# Patient Record
Sex: FEMALE | Race: WHITE | URBAN - METROPOLITAN AREA
[De-identification: names, ages, dates, MRNs, and addresses within clinical notes are randomized per-mention and may not be internally consistent; named-entity substitution may affect disease eponyms.]

---

## 2017-10-26 ENCOUNTER — OFFICE VISIT (OUTPATIENT)
Dept: ORTHOPEDIC SURGERY | Age: 27
End: 2017-10-26

## 2017-10-26 VITALS
WEIGHT: 195.99 LBS | DIASTOLIC BLOOD PRESSURE: 80 MMHG | RESPIRATION RATE: 17 BRPM | HEIGHT: 63 IN | BODY MASS INDEX: 34.73 KG/M2 | HEART RATE: 81 BPM | SYSTOLIC BLOOD PRESSURE: 119 MMHG

## 2017-10-26 DIAGNOSIS — M79.671 RIGHT FOOT PAIN: ICD-10-CM

## 2017-10-26 DIAGNOSIS — S92.354A CLOSED NONDISPLACED FRACTURE OF FIFTH METATARSAL BONE OF RIGHT FOOT, INITIAL ENCOUNTER: Primary | ICD-10-CM

## 2017-10-26 PROCEDURE — 73620 X-RAY EXAM OF FOOT: CPT | Performed by: ORTHOPAEDIC SURGERY

## 2017-10-26 PROCEDURE — L4361 PNEUMA/VAC WALK BOOT PRE OTS: HCPCS | Performed by: ORTHOPAEDIC SURGERY

## 2017-10-26 PROCEDURE — 28470 CLTX METATARSAL FX WO MNP EA: CPT | Performed by: ORTHOPAEDIC SURGERY

## 2017-10-26 PROCEDURE — G8427 DOCREV CUR MEDS BY ELIG CLIN: HCPCS | Performed by: ORTHOPAEDIC SURGERY

## 2017-10-26 PROCEDURE — 99203 OFFICE O/P NEW LOW 30 MIN: CPT | Performed by: ORTHOPAEDIC SURGERY

## 2017-10-26 PROCEDURE — G8417 CALC BMI ABV UP PARAM F/U: HCPCS | Performed by: ORTHOPAEDIC SURGERY

## 2017-10-26 PROCEDURE — G8484 FLU IMMUNIZE NO ADMIN: HCPCS | Performed by: ORTHOPAEDIC SURGERY

## 2017-10-26 PROCEDURE — 4004F PT TOBACCO SCREEN RCVD TLK: CPT | Performed by: ORTHOPAEDIC SURGERY

## 2017-10-26 RX ORDER — TRAMADOL HYDROCHLORIDE 50 MG/1
50 TABLET ORAL EVERY 6 HOURS PRN
Qty: 28 TABLET | Refills: 0 | Status: SHIPPED | OUTPATIENT
Start: 2017-10-26

## 2017-10-26 NOTE — PROGRESS NOTES
of wine twice a year    Drug use: No    Sexual activity: Yes     Partners: Male     Birth control/ protection: Implant     Other Topics Concern    Not on file     Social History Narrative    No narrative on file       Family History   Problem Relation Age of Onset    Adopted: Yes       Current Outpatient Prescriptions on File Prior to Visit   Medication Sig Dispense Refill    levothyroxine (SYNTHROID) 88 MCG tablet Take 1 tablet by mouth daily. For thyroid. 90 tablet 3    ibuprofen (ADVIL;MOTRIN) 800 MG tablet Take 800 mg by mouth daily. PRN  pain       No current facility-administered medications on file prior to visit. Pertinent items are noted in HPI  Review of systems reviewed from Patient History Form dated on 10/26/2017 and available in the patient's chart under the Media tab. PHYSICAL EXAMINATION:  Ms. Roberta Ledbetter is a very pleasant 28 y.o.  female who presents today in no acute distress, awake, alert, and oriented. She is well dressed, nourished and  groomed. Patient with normal affect. Height is  5' 2.99\" (1.6 m), weight is 195 lb 15.8 oz (88.9 kg), Body mass index is 34.73 kg/m². Resting respiratory rate is 16. Examination of the gait, showed that the patient walks with a crutch, NWB right  leg . Examination of both ankles showing a decreased range of motion of the right ankle compare to the other side because of foot pain. There is moderate swelling that can be seen, as well as ecchymosis over lateral side of the right foot. She  has intact sensation and good pedal pulses. She has significant tenderness on deep palpation over the 5th MT base right foot        IMAGING: Xray's were reviewed, dated 10/26/2017 ,  2 views of the right  foot, and showed a minimally displaced 5th MT base Andrade fracture. IMPRESSION: Right 5th MT base minimally displaced Andrade fracture.     PLAN: I discussed that the overall alignment of this fracture is good and that we can try to treat

## 2017-12-07 ENCOUNTER — OFFICE VISIT (OUTPATIENT)
Dept: ORTHOPEDIC SURGERY | Age: 27
End: 2017-12-07

## 2017-12-07 VITALS — HEIGHT: 62 IN | WEIGHT: 195 LBS | BODY MASS INDEX: 35.88 KG/M2 | RESPIRATION RATE: 15 BRPM

## 2017-12-07 DIAGNOSIS — M79.671 RIGHT FOOT PAIN: Primary | ICD-10-CM

## 2017-12-07 DIAGNOSIS — S99.191A CLOSED FRACTURE OF BASE OF FIFTH METATARSAL BONE OF RIGHT FOOT AT METAPHYSEAL-DIAPHYSEAL JUNCTION, INITIAL ENCOUNTER: ICD-10-CM

## 2017-12-07 PROCEDURE — 73630 X-RAY EXAM OF FOOT: CPT | Performed by: ORTHOPAEDIC SURGERY

## 2017-12-07 PROCEDURE — 99024 POSTOP FOLLOW-UP VISIT: CPT | Performed by: NURSE PRACTITIONER

## 2017-12-07 NOTE — LETTER
[facility] [address] [address]  Phone: [phone]  Fax: [phone]    Katerin Lo MD        December 21, 2017       Patient: Chris Rivera   MR Number: [id number]   YOB: 1990   Date of Visit: 12/7/2017       Dear Dr. Abhijit Hernández:    Thank you for the request for consultation for Chris Rivera to me for evaluation. Below are the relevant portions of my assessment and plan of care. DIAGNOSIS: Right foot 5th MT Andrade minimally displaced fracture. DATE OF INJURY:  10/26/2017 DOT:10/26/2017. Ms. Kyung Bullard 28 y.o.  female returns today for 6 weeks follow-up of a right foot 5th MT fracture. She reports minimal pain. Rates pain a 1-2/10 VAS mild achy and improving. Pain is worse with increased walking and better with rest and elevation. No numbness or tingling sensation. She is WB and is no longer in the boot at this point. She reports that she was incarcerated for 9 days and was not able to wear the boot while in intermediate. She has been WB for the past few weeks. PHYSICAL EXAMINATION:  Ms. Kyung Bullard is a very pleasant 28 y.o.  female who presents today in no acute distress, awake, alert, and oriented. She is well dressed, nourished and  groomed. Patient with normal affect. Height is  5' 2\" (1.575 m), weight is 195 lb (88.5 kg). Resting respiratory rate is 16. Examination of the gait, showed that the patient walks with no limp, WB right leg . Examination of both ankles showing a good range of motion of the right ankle compare to the other side. There is minimal to no swelling that can be seen, no ecchymosis over lateral side of the right foot. She has intact sensation and good pedal pulses.   She has no tenderness on deep palpation over the 5th MT base right foot        IMAGING: Xray's taken today in the office were reviewed, 3 views of the right foot, and showed a minimally displaced 5th MT base fracture healing

## 2018-03-15 ENCOUNTER — TELEPHONE (OUTPATIENT)
Dept: ORTHOPEDIC SURGERY | Age: 28
End: 2018-03-15

## 2024-10-11 ENCOUNTER — OFFICE VISIT (OUTPATIENT)
Age: 34
End: 2024-10-11

## 2024-10-11 VITALS
WEIGHT: 213 LBS | SYSTOLIC BLOOD PRESSURE: 143 MMHG | HEIGHT: 62 IN | OXYGEN SATURATION: 95 % | RESPIRATION RATE: 16 BRPM | DIASTOLIC BLOOD PRESSURE: 85 MMHG | TEMPERATURE: 98.4 F | HEART RATE: 87 BPM | BODY MASS INDEX: 39.2 KG/M2

## 2024-10-11 DIAGNOSIS — J40 BRONCHITIS: Primary | ICD-10-CM

## 2024-10-11 DIAGNOSIS — Z91.89 AT INCREASED RISK OF EXPOSURE TO COVID-19 VIRUS: ICD-10-CM

## 2024-10-11 LAB
Lab: NORMAL
PERFORMING INSTRUMENT: NORMAL
QC PASS/FAIL: NORMAL
SARS-COV-2, POC: NORMAL

## 2024-10-11 RX ORDER — METHYLPREDNISOLONE 4 MG
TABLET, DOSE PACK ORAL
Qty: 1 KIT | Refills: 0 | Status: SHIPPED | OUTPATIENT
Start: 2024-10-11

## 2024-10-11 RX ORDER — AZITHROMYCIN 250 MG/1
TABLET, FILM COATED ORAL
Qty: 6 TABLET | Refills: 0 | Status: SHIPPED | OUTPATIENT
Start: 2024-10-11 | End: 2024-10-21

## 2024-10-11 RX ORDER — AMITRIPTYLINE HYDROCHLORIDE 10 MG/1
10 TABLET ORAL NIGHTLY
COMMUNITY
Start: 2024-10-09 | End: 2025-10-09

## 2024-10-11 RX ORDER — BROMPHENIRAMINE MALEATE, PSEUDOEPHEDRINE HYDROCHLORIDE, AND DEXTROMETHORPHAN HYDROBROMIDE 2; 30; 10 MG/5ML; MG/5ML; MG/5ML
10 SYRUP ORAL 3 TIMES DAILY PRN
Qty: 240 ML | Refills: 0 | Status: SHIPPED | OUTPATIENT
Start: 2024-10-11

## 2024-10-11 RX ORDER — SERTRALINE HYDROCHLORIDE 25 MG/1
25 TABLET, FILM COATED ORAL DAILY
COMMUNITY
Start: 2024-10-02

## 2024-10-11 ASSESSMENT — ENCOUNTER SYMPTOMS
COUGH: 1
NAUSEA: 0
SINUS PRESSURE: 0
DIARRHEA: 0
SORE THROAT: 0
RHINORRHEA: 0
WHEEZING: 1
VOMITING: 0
TROUBLE SWALLOWING: 0
SHORTNESS OF BREATH: 0
CHEST TIGHTNESS: 0
SINUS PAIN: 0

## 2024-10-11 NOTE — PROGRESS NOTES
tylenol  and ibuprofen for pain    Go to the ER if you develop chest pain, chest pressure, symptoms worsen, or you develop:  Sweating.  Shortness of breath.  Nausea or vomiting.  Pain that spreads from the chest to the neck, jaw, or one or both shoulders or arms.  Dizziness or lightheadedness    Follow up with your primary care provider        Electronically signed by JENIFER Gibson CNP on 10/11/2024 at 3:24 PM

## 2024-10-11 NOTE — PATIENT INSTRUCTIONS
Take medications as directed    May alternate tylenol  and ibuprofen for pain    Go to the ER if you develop chest pain, chest pressure, symptoms worsen, or you develop:  Sweating.  Shortness of breath.  Nausea or vomiting.  Pain that spreads from the chest to the neck, jaw, or one or both shoulders or arms.  Dizziness or lightheadedness    Follow up with your primary care provider